# Patient Record
Sex: MALE | Race: WHITE | NOT HISPANIC OR LATINO | Employment: STUDENT | URBAN - METROPOLITAN AREA
[De-identification: names, ages, dates, MRNs, and addresses within clinical notes are randomized per-mention and may not be internally consistent; named-entity substitution may affect disease eponyms.]

---
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
## 2024-04-13 ENCOUNTER — HOSPITAL ENCOUNTER (EMERGENCY)
Facility: CLINIC | Age: 19
Discharge: HOME | End: 2024-04-13
Attending: EMERGENCY MEDICINE | Admitting: EMERGENCY MEDICINE
Payer: COMMERCIAL

## 2024-04-13 VITALS
BODY MASS INDEX: 21.31 KG/M2 | DIASTOLIC BLOOD PRESSURE: 76 MMHG | HEIGHT: 76 IN | HEART RATE: 75 BPM | OXYGEN SATURATION: 98 % | SYSTOLIC BLOOD PRESSURE: 124 MMHG | TEMPERATURE: 98 F | WEIGHT: 175 LBS | RESPIRATION RATE: 18 BRPM

## 2024-04-13 DIAGNOSIS — H16.133 UV KERATITIS, BILATERAL: ICD-10-CM

## 2024-04-13 PROCEDURE — 99284 EMERGENCY DEPT VISIT MOD MDM: CPT | Performed by: EMERGENCY MEDICINE

## 2024-04-13 PROCEDURE — 99283 EMERGENCY DEPT VISIT LOW MDM: CPT | Performed by: EMERGENCY MEDICINE

## 2024-04-13 PROCEDURE — 250N000009 HC RX 250: Performed by: EMERGENCY MEDICINE

## 2024-04-13 RX ORDER — PROPARACAINE HYDROCHLORIDE 5 MG/ML
1-2 SOLUTION/ DROPS OPHTHALMIC ONCE
Status: COMPLETED | OUTPATIENT
Start: 2024-04-13 | End: 2024-04-13

## 2024-04-13 RX ORDER — ERYTHROMYCIN 5 MG/G
OINTMENT OPHTHALMIC
Qty: 3.5 G | Refills: 0 | Status: SHIPPED | OUTPATIENT
Start: 2024-04-13 | End: 2024-04-20

## 2024-04-13 RX ORDER — POLYVINYL ALCOHOL/POVIDONE 0.5%-0.6%
1 DROPS OPHTHALMIC (EYE)
Qty: 15 ML | Refills: 0 | Status: SHIPPED | OUTPATIENT
Start: 2024-04-13 | End: 2024-04-20

## 2024-04-13 RX ADMIN — PROPARACAINE HYDROCHLORIDE 1 DROP: 5 SOLUTION/ DROPS OPHTHALMIC at 16:09

## 2024-04-13 RX ADMIN — FLUORESCEIN SODIUM 2 STRIP: 1 STRIP OPHTHALMIC at 16:10

## 2024-04-13 ASSESSMENT — VISUAL ACUITY
OU: 1
OD: 20/20
OS: 20/20

## 2024-04-13 ASSESSMENT — COLUMBIA-SUICIDE SEVERITY RATING SCALE - C-SSRS
2. HAVE YOU ACTUALLY HAD ANY THOUGHTS OF KILLING YOURSELF IN THE PAST MONTH?: NO
1. IN THE PAST MONTH, HAVE YOU WISHED YOU WERE DEAD OR WISHED YOU COULD GO TO SLEEP AND NOT WAKE UP?: NO
6. HAVE YOU EVER DONE ANYTHING, STARTED TO DO ANYTHING, OR PREPARED TO DO ANYTHING TO END YOUR LIFE?: NO

## 2024-04-13 ASSESSMENT — ACTIVITIES OF DAILY LIVING (ADL): ADLS_ACUITY_SCORE: 33

## 2024-04-13 NOTE — ED TRIAGE NOTES
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 Pt had no concerns at the time but noted light sensitivity when he awoke in the middle of the night.  Today complains of continued light sensitivity, a mild stinging sensation, and dry eye bilateral.       Triage Assessment (Adult)       Row Name 04/13/24 1532          Triage Assessment    Airway WDL WDL        Respiratory WDL    Respiratory WDL WDL        Skin Circulation/Temperature WDL    Skin Circulation/Temperature WDL WDL        Cardiac WDL    Cardiac WDL WDL        Peripheral/Neurovascular WDL    Peripheral Neurovascular WDL WDL        Cognitive/Neuro/Behavioral WDL    Cognitive/Neuro/Behavioral WDL WDL

## 2024-04-13 NOTE — DISCHARGE INSTRUCTIONS
Please make an appointment to follow up with Eye Clinic (phone: [phone]) in 1-7 days.    Rest eyes as much as possible until symptoms resolve.     Use artificial tears to keep lubricated.    Use erythromycin ointment at night.     Take ibuprofen or tylenol for pain, as needed.    Avoid rubbing eyes.    Return for worsening pain, eye redness, vision changes.

## 2024-04-13 NOTE — ED PROVIDER NOTES
"    [place] EMERGENCY DEPARTMENT ([facility])    4/13/24       ED PROVIDER NOTE  Vertical Triage A   History     Chief Complaint   Patient presents with    Eye Problem     The history is provided by the patient and medical records.     Wilson Jose is an otherwise healthy 19 year old male who presents to the ED for evaluation of eye pain and light sensitivity after welding. 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 He held a couple pieces of metal for them to weld, and was told he didn't need a welding newton or eye protection. He made sure to not look at the welding directly and was looking away from the arc. Patient had no concerns at the time but noted a subtle burning sensation to his eyes at around midnight last night. He states he gets frequent nosebleeds and had turned on the light to manage this, noticed intense bilateral burning photophobia with this. He continues to have light sensitivity, mild stinging eye pain, and bilateral dry eyes.  Pain is a 3/10 at this time. No blurry vision, headache, blind spots. He does not use any contact lens or glasses.      Past Medical History  History reviewed. No pertinent past medical history.  History reviewed. No pertinent surgical history.  Artificial Tear Solution (SM ARTIFICIAL TEARS) SOLN  erythromycin (ROMYCIN) 5 MG/GM ophthalmic ointment      No Known Allergies  Family History  History reviewed. No pertinent family history.  Social History   Social History     Tobacco Use    Smoking status: Never    Smokeless tobacco: Never         A medically appropriate review of systems was performed with pertinent positives and negatives noted in the HPI, and all other systems negative.    Physical Exam   BP: 124/76  Pulse: 75  Temp: 98  F (36.7  C)  Resp: 18  Height: 193 cm (6' 4\")  Weight: 79.4 kg (175 lb)  SpO2: 98 %  Physical Exam  Vitals and nursing note reviewed.   Constitutional:       General: He is not in acute " distress.     Appearance: Normal appearance. He is well-developed. He is not ill-appearing or diaphoretic.   HENT:      Head: Normocephalic and atraumatic.      Nose: Nose normal.      Mouth/Throat:      Mouth: Mucous membranes are moist.   Eyes:      General: Lids are normal. Vision grossly intact. Gaze aligned appropriately. No scleral icterus.        Right eye: No foreign body, discharge or hordeolum.         Left eye: No foreign body, discharge or hordeolum.      Extraocular Movements: Extraocular movements intact.      Conjunctiva/sclera: Conjunctivae normal.      Right eye: Right conjunctiva is not injected. No chemosis, exudate or hemorrhage.     Left eye: Left conjunctiva is not injected. No chemosis, exudate or hemorrhage.     Pupils: Pupils are equal, round, and reactive to light.      Right eye: No corneal abrasion or fluorescein uptake. Dong exam negative.      Left eye: No corneal abrasion or fluorescein uptake. Dong exam negative.     Slit lamp exam:     Right eye: Anterior chamber quiet. Photophobia present. No corneal flare, corneal ulcer, foreign body, hyphema or hypopyon.      Left eye: Anterior chamber quiet. Photophobia present. No corneal flare, corneal ulcer, foreign body, hyphema or hypopyon.   Cardiovascular:      Rate and Rhythm: Normal rate.   Pulmonary:      Effort: Pulmonary effort is normal. No respiratory distress.      Breath sounds: No stridor.   Abdominal:      General: There is no distension.      Palpations: Abdomen is soft.   Musculoskeletal:         General: No deformity or signs of injury. Normal range of motion.      Cervical back: Normal range of motion and neck supple. No rigidity.   Skin:     General: Skin is warm and dry.      Coloration: Skin is not jaundiced or pale.      Findings: No erythema or rash.   Neurological:      General: No focal deficit present.      Mental Status: He is alert and oriented to person, place, and time.   Psychiatric:         Behavior:  Behavior normal.         Thought Content: Thought content normal.           ED Course, Procedures, & Data      Procedures               No results found for any visits on 04/13/24.  Medications   proparacaine (ALCAINE) 0.5 % ophthalmic solution 1-2 drop (1 drop Both Eyes $Given by Other Clinician 4/13/24 5826)   fluorescein (FUL-DAYANNA) ophthalmic strip 2 strip (2 strips Both Eyes $Given by Other Clinician 4/13/24 2010)     Labs Ordered and Resulted from Time of ED Arrival to Time of ED Departure - No data to display  No orders to display          Critical care was not performed.     Medical Decision Making  The patient's presentation was of low complexity (an acute and uncomplicated illness or injury).    The patient's evaluation involved:  ordering and/or review of 3+ test(s) in this encounter (visual acuity, fluorescein exam, slit lamp exam)  discussion of management or test interpretation with another health professional (Ophtho)    The patient's management necessitated moderate risk (prescription drug management including medications given in the ED).    Assessment & Plan    Wilson Jose is an otherwise healthy 19 year old male who presents to the ED for evaluation of eye pain and light sensitivity after welding.    Ddx: UV keratitis, corneal abrasion, conjunctivitis    Patient with classic clinical presentation of UV keratitis after mild exposure to welding. No visualized fluorescein uptake on slit lamp examination. Visual acuity nl. Discussed with ophtho who agreed with management: discharging with erythromycin ointment and artificial tears. Expectant management with eye rest and nsaids for pain control. Follow up with ophtho clinic within the week. Ophtho resident to alert schedulers of patient's follow up. Return precautions provided.         I have reviewed the nursing notes. I have reviewed the findings, diagnosis, plan and need for follow up with the patient.    New Prescriptions    ARTIFICIAL TEAR  SOLUTION (SM ARTIFICIAL TEARS) SOLN    Apply 1 drop to eye every hour as needed (eye pain)    ERYTHROMYCIN (ROMYCIN) 5 MG/GM OPHTHALMIC OINTMENT    Apply 1 cm ribbon in affected eye(s) four times a day.       Final diagnoses:   UV keratitis, bilateral     IRosanna, am serving as a trained medical scribe to document services personally performed by She Joseph MD based on the provider's statements to me on April 13, 2024.  This document has been checked and approved by the attending provider.    IShe MD, was physically present and have reviewed and verified the accuracy of this note documented by Rosanna Pitts, medical scribe.      She Joseph MD   [facility] EMERGENCY DEPARTMENT  4/13/2024     She Joseph MD  04/13/24 5162

## 2024-04-15 ENCOUNTER — TELEPHONE (OUTPATIENT)
Dept: OPHTHALMOLOGY | Facility: CLINIC | Age: 19
End: 2024-04-15
Payer: COMMERCIAL

## 2024-04-15 NOTE — TELEPHONE ENCOUNTER
Called and spoke to Wilson Shafer to schedule with acute or with Dr. Zelaya / Dr. Torres in a new adult eye     [facility] Communication Facilitator on 4/15/2024 at 11:19 AM

## 2024-04-16 NOTE — TELEPHONE ENCOUNTER
Please schedule this patient to be seen in clinic sometime Mon-Wed this week. Any available provider.     Follow up for surface abrasion, though given the mechanism he will need:     OCT macula and VTD     --    Above per on call eye provider.    Triage reached out yesterday and spoke to pt    Scheduled appt tomorrow in acute eye clinic at [facility] location.    Pt aware of date/time/location/duration/non-[employer] insurance.    Al Schneider RN 8:39 AM 04/16/24